# Patient Record
Sex: MALE | Race: BLACK OR AFRICAN AMERICAN | NOT HISPANIC OR LATINO | Employment: FULL TIME | URBAN - METROPOLITAN AREA
[De-identification: names, ages, dates, MRNs, and addresses within clinical notes are randomized per-mention and may not be internally consistent; named-entity substitution may affect disease eponyms.]

---

## 2017-02-19 ENCOUNTER — HOSPITAL ENCOUNTER (EMERGENCY)
Facility: HOSPITAL | Age: 22
Discharge: HOME | End: 2017-02-19
Attending: EMERGENCY MEDICINE | Admitting: EMERGENCY MEDICINE
Payer: COMMERCIAL

## 2017-02-19 VITALS
RESPIRATION RATE: 18 BRPM | OXYGEN SATURATION: 99 % | TEMPERATURE: 97.6 F | BODY MASS INDEX: 23.71 KG/M2 | SYSTOLIC BLOOD PRESSURE: 110 MMHG | DIASTOLIC BLOOD PRESSURE: 61 MMHG | HEART RATE: 78 BPM | WEIGHT: 170 LBS

## 2017-02-19 DIAGNOSIS — M54.50 ACUTE RIGHT-SIDED LOW BACK PAIN WITHOUT SCIATICA: ICD-10-CM

## 2017-02-19 DIAGNOSIS — R79.89 ELEVATED SERUM CREATININE: ICD-10-CM

## 2017-02-19 DIAGNOSIS — R10.31 BILATERAL GROIN PAIN: Primary | ICD-10-CM

## 2017-02-19 DIAGNOSIS — R10.32 BILATERAL GROIN PAIN: Primary | ICD-10-CM

## 2017-02-19 LAB
ANION GAP SERPL CALCULATED.3IONS-SCNC: 9 MMOL/L (ref 4–13)
BASOPHILS # BLD AUTO: 0.02 THOUSANDS/ΜL (ref 0–0.1)
BASOPHILS NFR BLD AUTO: 0 % (ref 0–1)
BUN SERPL-MCNC: 16 MG/DL (ref 5–25)
CALCIUM SERPL-MCNC: 9.2 MG/DL (ref 8.3–10.1)
CHLORIDE SERPL-SCNC: 103 MMOL/L (ref 100–108)
CLARITY, POC: CLEAR
CO2 SERPL-SCNC: 29 MMOL/L (ref 21–32)
COLOR, POC: YELLOW
CREAT SERPL-MCNC: 1.34 MG/DL (ref 0.6–1.3)
EOSINOPHIL # BLD AUTO: 0.11 THOUSAND/ΜL (ref 0–0.61)
EOSINOPHIL NFR BLD AUTO: 2 % (ref 0–6)
ERYTHROCYTE [DISTWIDTH] IN BLOOD BY AUTOMATED COUNT: 12.3 % (ref 11.6–15.1)
EXT BILIRUBIN, UA: NEGATIVE
EXT BLOOD URINE: NEGATIVE
EXT GLUCOSE, UA: NEGATIVE
EXT KETONES: NEGATIVE
EXT NITRITE, UA: NEGATIVE
EXT PH, UA: 6.5
EXT PROTEIN, UA: NEGATIVE
EXT SPECIFIC GRAVITY, UA: 1.01
EXT UROBILINOGEN: NEGATIVE
GFR SERPL CREATININE-BSD FRML MDRD: >60 ML/MIN/1.73SQ M
GLUCOSE SERPL-MCNC: 83 MG/DL (ref 65–140)
HCT VFR BLD AUTO: 46.3 % (ref 36.5–49.3)
HGB BLD-MCNC: 15.1 G/DL (ref 12–17)
LYMPHOCYTES # BLD AUTO: 2.09 THOUSANDS/ΜL (ref 0.6–4.47)
LYMPHOCYTES NFR BLD AUTO: 33 % (ref 14–44)
MCH RBC QN AUTO: 28.8 PG (ref 26.8–34.3)
MCHC RBC AUTO-ENTMCNC: 32.6 G/DL (ref 31.4–37.4)
MCV RBC AUTO: 88 FL (ref 82–98)
MONOCYTES # BLD AUTO: 0.45 THOUSAND/ΜL (ref 0.17–1.22)
MONOCYTES NFR BLD AUTO: 7 % (ref 4–12)
NEUTROPHILS # BLD AUTO: 3.64 THOUSANDS/ΜL (ref 1.85–7.62)
NEUTS SEG NFR BLD AUTO: 58 % (ref 43–75)
NRBC BLD AUTO-RTO: 0 /100 WBCS
PLATELET # BLD AUTO: 333 THOUSANDS/UL (ref 149–390)
PMV BLD AUTO: 8.9 FL (ref 8.9–12.7)
POTASSIUM SERPL-SCNC: 3.7 MMOL/L (ref 3.5–5.3)
RBC # BLD AUTO: 5.25 MILLION/UL (ref 3.88–5.62)
SODIUM SERPL-SCNC: 141 MMOL/L (ref 136–145)
WBC # BLD AUTO: 6.32 THOUSAND/UL (ref 4.31–10.16)
WBC # BLD EST: NEGATIVE 10*3/UL

## 2017-02-19 PROCEDURE — 99283 EMERGENCY DEPT VISIT LOW MDM: CPT

## 2017-02-19 PROCEDURE — 85025 COMPLETE CBC W/AUTO DIFF WBC: CPT | Performed by: EMERGENCY MEDICINE

## 2017-02-19 PROCEDURE — A9270 NON-COVERED ITEM OR SERVICE: HCPCS | Performed by: EMERGENCY MEDICINE

## 2017-02-19 PROCEDURE — 87591 N.GONORRHOEAE DNA AMP PROB: CPT | Performed by: EMERGENCY MEDICINE

## 2017-02-19 PROCEDURE — 36415 COLL VENOUS BLD VENIPUNCTURE: CPT | Performed by: EMERGENCY MEDICINE

## 2017-02-19 PROCEDURE — 87491 CHLMYD TRACH DNA AMP PROBE: CPT | Performed by: EMERGENCY MEDICINE

## 2017-02-19 PROCEDURE — 80048 BASIC METABOLIC PNL TOTAL CA: CPT | Performed by: EMERGENCY MEDICINE

## 2017-02-19 PROCEDURE — 81002 URINALYSIS NONAUTO W/O SCOPE: CPT | Performed by: EMERGENCY MEDICINE

## 2017-02-19 RX ORDER — IBUPROFEN 600 MG/1
600 TABLET ORAL ONCE
Status: COMPLETED | OUTPATIENT
Start: 2017-02-19 | End: 2017-02-19

## 2017-02-19 RX ADMIN — IBUPROFEN 600 MG: 600 TABLET ORAL at 13:37

## 2017-02-20 LAB
CHLAMYDIA DNA CVX QL NAA+PROBE: NORMAL
N GONORRHOEA DNA GENITAL QL NAA+PROBE: NORMAL

## 2020-07-30 ENCOUNTER — OFFICE VISIT (OUTPATIENT)
Dept: GASTROENTEROLOGY | Facility: CLINIC | Age: 25
End: 2020-07-30
Payer: COMMERCIAL

## 2020-07-30 VITALS
TEMPERATURE: 98.7 F | SYSTOLIC BLOOD PRESSURE: 108 MMHG | HEIGHT: 71 IN | HEART RATE: 64 BPM | DIASTOLIC BLOOD PRESSURE: 70 MMHG | BODY MASS INDEX: 21.84 KG/M2 | WEIGHT: 156 LBS

## 2020-07-30 DIAGNOSIS — R11.0 NAUSEA: Primary | ICD-10-CM

## 2020-07-30 DIAGNOSIS — K21.9 GASTROESOPHAGEAL REFLUX DISEASE, ESOPHAGITIS PRESENCE NOT SPECIFIED: ICD-10-CM

## 2020-07-30 PROCEDURE — 99203 OFFICE O/P NEW LOW 30 MIN: CPT | Performed by: PHYSICIAN ASSISTANT

## 2020-07-30 RX ORDER — PANTOPRAZOLE SODIUM 40 MG/1
40 TABLET, DELAYED RELEASE ORAL DAILY
Qty: 30 TABLET | Refills: 1 | Status: SHIPPED | OUTPATIENT
Start: 2020-07-30 | End: 2020-08-29

## 2020-07-30 NOTE — PROGRESS NOTES
[facility] - Outpatient Consultation  Rao Ureña 30 y o  male MRN: [id number]  Encounter: [id number]          ASSESSMENT AND PLAN:      1  Nausea  2  Gastroesophageal reflux disease    Patient reports nausea and an upset stomach for a couple of weeks  He reports relief when he takes ivanna-seltzer  No alarm symptoms: no weight loss, inability to eat, dysphagia, or melena  Will begin a PPI trial with Pantoprazole 40mg po daily x 6 weeks  GERD modifications discussed  Will check ultrasound  If symptoms do not improve on the PPI trial or he develops alarm symptoms, will need to proceed with an EGD for further evaluation  This was discussed with patient and he was instructed to call in a couple of weeks to discuss his progress or sooner if needed  ____________________________________________________________________    HPI:  Patient is a 53-year-old male who presents to the office for an evaluation of nausea and an upset stomach for a couple of weeks  Patient denies any vomiting  He denies any actual pain he just reports that it feels auneasy  He denies any dysphagia  He denies any heartburn  He does report Ivanna-[place] helps to relieve his symptoms  He denies any problems with constipation or diarrhea  He denies any melena or rectal bleeding  He does report that his father struggles with acid reflux  He has never had an EGD or colonoscopy in the past   He denies any unintentional weight loss  He does report increased stress in his life as he is preparing to moved to [place] in a couple of months  He also does report a history of a lactose intolerance  He denies any frequent NSAIDs  REVIEW OF SYSTEMS:    CONSTITUTIONAL: Denies any fever, chills, rigors, and weight loss  HEENT: No earache or tinnitus  Denies hearing loss or visual disturbances  CARDIOVASCULAR: No chest pain or palpitations     RESPIRATORY: Denies any cough, hemoptysis, shortness of breath or dyspnea on exertion  GASTROINTESTINAL: As noted in the History of Present Illness  GENITOURINARY: No problems with urination  Denies any hematuria or dysuria  NEUROLOGIC: No dizziness or vertigo, denies headaches  MUSCULOSKELETAL: Denies any muscle or joint pain  SKIN: Denies skin rashes or itching  ENDOCRINE: Denies excessive thirst  Denies intolerance to heat or cold  PSYCHOSOCIAL: Denies depression or anxiety  Denies any recent memory loss  Historical Information   History reviewed  No pertinent past medical history  Past Surgical History:   Procedure Laterality Date    HERNIA REPAIR       Social History   Social History     Substance and Sexual Activity   Alcohol Use Yes    Alcohol/week: 1 0 standard drinks    Types: 1 Glasses of wine per week    Frequency: Monthly or less    Drinks per session: 1 or 2    Comment: Occasional     Social History     Substance and Sexual Activity   Drug Use No     Social History     Tobacco Use   Smoking Status Never Smoker   Smokeless Tobacco Never Used     History reviewed  No pertinent family history  Meds/Allergies       Current Outpatient Medications:     pantoprazole (PROTONIX) 40 mg tablet    Allergies   Allergen Reactions    Lactose Intolerance (Gi) GI Intolerance     Other reaction(s): bloating           Objective     Blood pressure 108/70, pulse 64, temperature 98 7 °F (37 1 °C), height 5' 11" (1 803 m), weight 70 8 kg (156 lb)  Body mass index is 21 76 kg/m²  PHYSICAL EXAM:      General Appearance:   Alert, cooperative, no distress   HEENT:   Normocephalic, atraumatic, anicteric    Neck:  Supple, symmetrical, trachea midline   Lungs:   Clear to auscultation bilaterally; no rales, rhonchi or wheezing; respirations unlabored    Heart[de-identified]   Regular rate and rhythm; no murmur, rub, or gallop     Abdomen:   Soft, non-tender, non-distended; normal bowel sounds; no masses, no organomegaly    Genitalia:   Deferred    Rectal:   Deferred  Extremities:  No cyanosis, clubbing or edema    Pulses:  2+ and symmetric    Skin:  No jaundice, rashes, or lesions    Lymph nodes:  No palpable cervical lymphadenopathy        Lab Results:   No visits with results within 1 Day(s) from this visit     Latest known visit with results is:   Admission on 02/19/2017, Discharged on 02/19/2017   Component Date Value    Color, UA 02/19/2017 yellow     Clarity, UA 02/19/2017 clear     Glucose, UA (Ref: Negati* 02/19/2017 negative     Bilirubin, UA (Ref: Nega* 02/19/2017 negative     Ketones, UA (Ref: Negati* 02/19/2017 negative     Spec Grav, UA (Ref:1 003* 02/19/2017 1 015     pH, UA (Ref: 4 5-8 0) 02/19/2017 6 5     Protein, UA (Ref: Negati* 02/19/2017 negative     Urobilinogen, UA (Ref: 0* 02/19/2017 negative     Nitrite, UA (Ref: Negati* 02/19/2017 negative      Leukocytes, UA (Ref: Ne* 02/19/2017 negative     Blood, UA (Ref: Negative) 02/19/2017 negative     N gonorrhoeae, DNA Probe 02/19/2017 N  gonorrhoeae Amplified DNA Negative     Chlamydia, DNA Probe 02/19/2017 C  trachomatis Amplified DNA Negative     WBC 02/19/2017 6 32     RBC 02/19/2017 5 25     Hemoglobin 02/19/2017 15 1     Hematocrit 02/19/2017 46 3     MCV 02/19/2017 88     MCH 02/19/2017 28 8     MCHC 02/19/2017 32 6     RDW 02/19/2017 12 3     MPV 02/19/2017 8 9     Platelets 03/69/0838 333     nRBC 02/19/2017 0     Neutrophils Relative 02/19/2017 58     Lymphocytes Relative 02/19/2017 33     Monocytes Relative 02/19/2017 7     Eosinophils Relative 02/19/2017 2     Basophils Relative 02/19/2017 0     Neutrophils Absolute 02/19/2017 3 64     Lymphocytes Absolute 02/19/2017 2 09     Monocytes Absolute 02/19/2017 0 45     Eosinophils Absolute 02/19/2017 0 11     Basophils Absolute 02/19/2017 0 02     Sodium 02/19/2017 141     Potassium 02/19/2017 3 7     Chloride 02/19/2017 103     CO2 02/19/2017 29     ANION GAP 02/19/2017 9     BUN 02/19/2017 16     Creatinine 02/19/2017 1 34*    Glucose 02/19/2017 83     Calcium 02/19/2017 9 2     eGFR 02/19/2017 >60 0          Radiology Results:   No results found